# Patient Record
Sex: FEMALE | Race: WHITE | Employment: FULL TIME | URBAN - METROPOLITAN AREA
[De-identification: names, ages, dates, MRNs, and addresses within clinical notes are randomized per-mention and may not be internally consistent; named-entity substitution may affect disease eponyms.]

---

## 2023-06-13 LAB
C. TRACHOMATIS, EXTERNAL RESULT: NEGATIVE
N. GONORRHOEAE, EXTERNAL RESULT: NEGATIVE

## 2023-06-16 LAB
ABO, EXTERNAL RESULT: NORMAL
HEP B, EXTERNAL RESULT: NEGATIVE
HIV, EXTERNAL RESULT: NEGATIVE
RH FACTOR, EXTERNAL RESULT: POSITIVE
RPR, EXTERNAL RESULT: NORMAL
RUBELLA TITER, EXTERNAL RESULT: NORMAL

## 2023-12-26 LAB — GBS, EXTERNAL RESULT: POSITIVE

## 2024-01-15 ENCOUNTER — ANESTHESIA EVENT (OUTPATIENT)
Facility: HOSPITAL | Age: 29
End: 2024-01-15
Payer: COMMERCIAL

## 2024-01-15 ENCOUNTER — HOSPITAL ENCOUNTER (INPATIENT)
Facility: HOSPITAL | Age: 29
LOS: 2 days | Discharge: HOME | End: 2024-01-17
Attending: STUDENT IN AN ORGANIZED HEALTH CARE EDUCATION/TRAINING PROGRAM | Admitting: OBSTETRICS & GYNECOLOGY
Payer: COMMERCIAL

## 2024-01-15 ENCOUNTER — ANESTHESIA (OUTPATIENT)
Facility: HOSPITAL | Age: 29
End: 2024-01-15
Payer: COMMERCIAL

## 2024-01-15 PROBLEM — Z37.9 NORMAL LABOR: Status: ACTIVE | Noted: 2024-01-15

## 2024-01-15 LAB
ABO + RH BLD: NORMAL
AMNISURE, POC: NEGATIVE
BASOPHILS # BLD: 0.1 K/UL (ref 0–0.1)
BASOPHILS NFR BLD: 0 % (ref 0–1)
BLOOD GROUP ANTIBODIES SERPL: NORMAL
DIFFERENTIAL METHOD BLD: ABNORMAL
EOSINOPHIL # BLD: 0.1 K/UL (ref 0–0.4)
EOSINOPHIL NFR BLD: 1 % (ref 0–7)
ERYTHROCYTE [DISTWIDTH] IN BLOOD BY AUTOMATED COUNT: 15.4 % (ref 11.5–14.5)
HCT VFR BLD AUTO: 34 % (ref 35–47)
HGB BLD-MCNC: 10.7 G/DL (ref 11.5–16)
IMM GRANULOCYTES # BLD AUTO: 0.1 K/UL (ref 0–0.04)
IMM GRANULOCYTES NFR BLD AUTO: 1 % (ref 0–0.5)
LYMPHOCYTES # BLD: 2.7 K/UL (ref 0.8–3.5)
LYMPHOCYTES NFR BLD: 17 % (ref 12–49)
Lab: NORMAL
MCH RBC QN AUTO: 26.2 PG (ref 26–34)
MCHC RBC AUTO-ENTMCNC: 31.5 G/DL (ref 30–36.5)
MCV RBC AUTO: 83.3 FL (ref 80–99)
MONOCYTES # BLD: 1.3 K/UL (ref 0–1)
MONOCYTES NFR BLD: 8 % (ref 5–13)
NEGATIVE QC PASS/FAIL: NORMAL
NEUTS SEG # BLD: 11.4 K/UL (ref 1.8–8)
NEUTS SEG NFR BLD: 73 % (ref 32–75)
NRBC # BLD: 0 K/UL (ref 0–0.01)
NRBC BLD-RTO: 0 PER 100 WBC
PLATELET # BLD AUTO: 241 K/UL (ref 150–400)
PMV BLD AUTO: 12.5 FL (ref 8.9–12.9)
POSITIVE QC PASS/FAIL: NORMAL
RBC # BLD AUTO: 4.08 M/UL (ref 3.8–5.2)
SPECIMEN EXP DATE BLD: NORMAL
WBC # BLD AUTO: 15.6 K/UL (ref 3.6–11)

## 2024-01-15 PROCEDURE — 2500000003 HC RX 250 WO HCPCS: Performed by: ANESTHESIOLOGY

## 2024-01-15 PROCEDURE — 2580000003 HC RX 258: Performed by: STUDENT IN AN ORGANIZED HEALTH CARE EDUCATION/TRAINING PROGRAM

## 2024-01-15 PROCEDURE — 2580000003 HC RX 258: Performed by: OBSTETRICS & GYNECOLOGY

## 2024-01-15 PROCEDURE — 6360000002 HC RX W HCPCS: Performed by: STUDENT IN AN ORGANIZED HEALTH CARE EDUCATION/TRAINING PROGRAM

## 2024-01-15 PROCEDURE — 36415 COLL VENOUS BLD VENIPUNCTURE: CPT

## 2024-01-15 PROCEDURE — 2500000003 HC RX 250 WO HCPCS: Performed by: OBSTETRICS & GYNECOLOGY

## 2024-01-15 PROCEDURE — 0KQM0ZZ REPAIR PERINEUM MUSCLE, OPEN APPROACH: ICD-10-PCS | Performed by: STUDENT IN AN ORGANIZED HEALTH CARE EDUCATION/TRAINING PROGRAM

## 2024-01-15 PROCEDURE — 86901 BLOOD TYPING SEROLOGIC RH(D): CPT

## 2024-01-15 PROCEDURE — 6360000002 HC RX W HCPCS: Performed by: ANESTHESIOLOGY

## 2024-01-15 PROCEDURE — 1120000000 HC RM PRIVATE OB

## 2024-01-15 PROCEDURE — 6360000002 HC RX W HCPCS: Performed by: OBSTETRICS & GYNECOLOGY

## 2024-01-15 PROCEDURE — 7210000100 HC LABOR FEE PER 1 HR: Performed by: STUDENT IN AN ORGANIZED HEALTH CARE EDUCATION/TRAINING PROGRAM

## 2024-01-15 PROCEDURE — 3700000025 EPIDURAL BLOCK: Performed by: ANESTHESIOLOGY

## 2024-01-15 PROCEDURE — 86850 RBC ANTIBODY SCREEN: CPT

## 2024-01-15 PROCEDURE — 6370000000 HC RX 637 (ALT 250 FOR IP): Performed by: STUDENT IN AN ORGANIZED HEALTH CARE EDUCATION/TRAINING PROGRAM

## 2024-01-15 PROCEDURE — 7220000101 HC DELIVERY VAGINAL/SINGLE: Performed by: STUDENT IN AN ORGANIZED HEALTH CARE EDUCATION/TRAINING PROGRAM

## 2024-01-15 PROCEDURE — 94761 N-INVAS EAR/PLS OXIMETRY MLT: CPT

## 2024-01-15 PROCEDURE — 00HU33Z INSERTION OF INFUSION DEVICE INTO SPINAL CANAL, PERCUTANEOUS APPROACH: ICD-10-PCS | Performed by: ANESTHESIOLOGY

## 2024-01-15 PROCEDURE — 3700000156 HC EPIDURAL ANESTHESIA: Performed by: ANESTHESIOLOGY

## 2024-01-15 PROCEDURE — 86900 BLOOD TYPING SEROLOGIC ABO: CPT

## 2024-01-15 PROCEDURE — 4A1HXCZ MONITORING OF PRODUCTS OF CONCEPTION, CARDIAC RATE, EXTERNAL APPROACH: ICD-10-PCS | Performed by: STUDENT IN AN ORGANIZED HEALTH CARE EDUCATION/TRAINING PROGRAM

## 2024-01-15 PROCEDURE — 85025 COMPLETE CBC W/AUTO DIFF WBC: CPT

## 2024-01-15 PROCEDURE — 6370000000 HC RX 637 (ALT 250 FOR IP): Performed by: OBSTETRICS & GYNECOLOGY

## 2024-01-15 RX ORDER — SODIUM CHLORIDE, SODIUM LACTATE, POTASSIUM CHLORIDE, CALCIUM CHLORIDE 600; 310; 30; 20 MG/100ML; MG/100ML; MG/100ML; MG/100ML
INJECTION, SOLUTION INTRAVENOUS CONTINUOUS
Status: DISCONTINUED | OUTPATIENT
Start: 2024-01-15 | End: 2024-01-15 | Stop reason: SDUPTHER

## 2024-01-15 RX ORDER — SODIUM CHLORIDE, SODIUM LACTATE, POTASSIUM CHLORIDE, CALCIUM CHLORIDE 600; 310; 30; 20 MG/100ML; MG/100ML; MG/100ML; MG/100ML
INJECTION, SOLUTION INTRAVENOUS CONTINUOUS
Status: DISCONTINUED | OUTPATIENT
Start: 2024-01-15 | End: 2024-01-17 | Stop reason: HOSPADM

## 2024-01-15 RX ORDER — DOCUSATE SODIUM 100 MG/1
100 CAPSULE, LIQUID FILLED ORAL 2 TIMES DAILY
Status: DISCONTINUED | OUTPATIENT
Start: 2024-01-15 | End: 2024-01-17 | Stop reason: HOSPADM

## 2024-01-15 RX ORDER — FERROUS SULFATE 325(65) MG
325 TABLET ORAL EVERY OTHER DAY
COMMUNITY

## 2024-01-15 RX ORDER — BUSPIRONE HYDROCHLORIDE 5 MG/1
15 TABLET ORAL DAILY
Status: DISCONTINUED | OUTPATIENT
Start: 2024-01-15 | End: 2024-01-15

## 2024-01-15 RX ORDER — MISOPROSTOL 200 UG/1
800 TABLET ORAL PRN
Status: DISCONTINUED | OUTPATIENT
Start: 2024-01-15 | End: 2024-01-15 | Stop reason: SDUPTHER

## 2024-01-15 RX ORDER — MAGNESIUM CARB/ALUMINUM HYDROX 105-160MG
30 TABLET,CHEWABLE ORAL DAILY PRN
Status: DISCONTINUED | OUTPATIENT
Start: 2024-01-15 | End: 2024-01-17 | Stop reason: HOSPADM

## 2024-01-15 RX ORDER — CARBOPROST TROMETHAMINE 250 UG/ML
250 INJECTION, SOLUTION INTRAMUSCULAR PRN
Status: DISCONTINUED | OUTPATIENT
Start: 2024-01-15 | End: 2024-01-17 | Stop reason: HOSPADM

## 2024-01-15 RX ORDER — SODIUM CHLORIDE 9 MG/ML
25 INJECTION, SOLUTION INTRAVENOUS PRN
Status: DISCONTINUED | OUTPATIENT
Start: 2024-01-15 | End: 2024-01-15 | Stop reason: SDUPTHER

## 2024-01-15 RX ORDER — BUSPIRONE HYDROCHLORIDE 15 MG/1
15 TABLET ORAL 2 TIMES DAILY
COMMUNITY

## 2024-01-15 RX ORDER — IBUPROFEN 800 MG/1
800 TABLET ORAL EVERY 8 HOURS PRN
Status: DISCONTINUED | OUTPATIENT
Start: 2024-01-15 | End: 2024-01-15 | Stop reason: SDUPTHER

## 2024-01-15 RX ORDER — DOCUSATE SODIUM 100 MG/1
100 CAPSULE, LIQUID FILLED ORAL 2 TIMES DAILY PRN
Status: DISCONTINUED | OUTPATIENT
Start: 2024-01-15 | End: 2024-01-17 | Stop reason: HOSPADM

## 2024-01-15 RX ORDER — POLYETHYLENE GLYCOL 3350 17 G/17G
17 POWDER, FOR SOLUTION ORAL DAILY PRN
Status: DISCONTINUED | OUTPATIENT
Start: 2024-01-15 | End: 2024-01-17 | Stop reason: HOSPADM

## 2024-01-15 RX ORDER — LAMOTRIGINE 100 MG/1
100 TABLET ORAL 2 TIMES DAILY
Status: DISCONTINUED | OUTPATIENT
Start: 2024-01-15 | End: 2024-01-17 | Stop reason: HOSPADM

## 2024-01-15 RX ORDER — ONDANSETRON 2 MG/ML
4 INJECTION INTRAMUSCULAR; INTRAVENOUS EVERY 6 HOURS PRN
Status: DISCONTINUED | OUTPATIENT
Start: 2024-01-15 | End: 2024-01-15 | Stop reason: SDUPTHER

## 2024-01-15 RX ORDER — FENTANYL CITRATE 50 UG/ML
25 INJECTION, SOLUTION INTRAMUSCULAR; INTRAVENOUS
Status: DISCONTINUED | OUTPATIENT
Start: 2024-01-15 | End: 2024-01-17 | Stop reason: HOSPADM

## 2024-01-15 RX ORDER — LAMOTRIGINE 100 MG/1
200 TABLET ORAL DAILY
Status: DISCONTINUED | OUTPATIENT
Start: 2024-01-15 | End: 2024-01-15

## 2024-01-15 RX ORDER — SODIUM CHLORIDE 0.9 % (FLUSH) 0.9 %
5-40 SYRINGE (ML) INJECTION PRN
Status: DISCONTINUED | OUTPATIENT
Start: 2024-01-15 | End: 2024-01-17 | Stop reason: HOSPADM

## 2024-01-15 RX ORDER — BUPIVACAINE HYDROCHLORIDE 2.5 MG/ML
INJECTION, SOLUTION EPIDURAL; INFILTRATION; INTRACAUDAL PRN
Status: DISCONTINUED | OUTPATIENT
Start: 2024-01-15 | End: 2024-01-15 | Stop reason: SDUPTHER

## 2024-01-15 RX ORDER — TRANEXAMIC ACID 10 MG/ML
1000 INJECTION, SOLUTION INTRAVENOUS
Status: COMPLETED | OUTPATIENT
Start: 2024-01-15 | End: 2024-01-15

## 2024-01-15 RX ORDER — BUSPIRONE HYDROCHLORIDE 15 MG/1
15 TABLET ORAL 2 TIMES DAILY
Status: DISCONTINUED | OUTPATIENT
Start: 2024-01-16 | End: 2024-01-15

## 2024-01-15 RX ORDER — LANOLIN/MINERAL OIL
LOTION (ML) TOPICAL PRN
Status: DISCONTINUED | OUTPATIENT
Start: 2024-01-15 | End: 2024-01-17 | Stop reason: HOSPADM

## 2024-01-15 RX ORDER — IBUPROFEN 800 MG/1
800 TABLET ORAL EVERY 8 HOURS PRN
Status: DISCONTINUED | OUTPATIENT
Start: 2024-01-15 | End: 2024-01-17 | Stop reason: HOSPADM

## 2024-01-15 RX ORDER — MISOPROSTOL 200 UG/1
200 TABLET ORAL PRN
Status: DISCONTINUED | OUTPATIENT
Start: 2024-01-15 | End: 2024-01-17 | Stop reason: HOSPADM

## 2024-01-15 RX ORDER — LIDOCAINE HYDROCHLORIDE AND EPINEPHRINE 15; 5 MG/ML; UG/ML
INJECTION, SOLUTION EPIDURAL PRN
Status: DISCONTINUED | OUTPATIENT
Start: 2024-01-15 | End: 2024-01-15 | Stop reason: SDUPTHER

## 2024-01-15 RX ORDER — FAMOTIDINE 20 MG/1
20 TABLET, FILM COATED ORAL 2 TIMES DAILY PRN
Status: DISCONTINUED | OUTPATIENT
Start: 2024-01-15 | End: 2024-01-17 | Stop reason: HOSPADM

## 2024-01-15 RX ORDER — SIMETHICONE 80 MG
80 TABLET,CHEWABLE ORAL EVERY 6 HOURS PRN
Status: DISCONTINUED | OUTPATIENT
Start: 2024-01-15 | End: 2024-01-17 | Stop reason: HOSPADM

## 2024-01-15 RX ORDER — FENTANYL 0.2 MG/100ML-BUPIV 0.125%-NACL 0.9% EPIDURAL INJ 2/0.125 MCG/ML-%
12 SOLUTION INJECTION CONTINUOUS
Status: DISCONTINUED | OUTPATIENT
Start: 2024-01-15 | End: 2024-01-17 | Stop reason: HOSPADM

## 2024-01-15 RX ORDER — SODIUM CHLORIDE 0.9 % (FLUSH) 0.9 %
5-40 SYRINGE (ML) INJECTION EVERY 12 HOURS SCHEDULED
Status: DISCONTINUED | OUTPATIENT
Start: 2024-01-15 | End: 2024-01-17 | Stop reason: HOSPADM

## 2024-01-15 RX ORDER — ACETAMINOPHEN 325 MG/1
650 TABLET ORAL EVERY 4 HOURS PRN
Status: DISCONTINUED | OUTPATIENT
Start: 2024-01-15 | End: 2024-01-15 | Stop reason: SDUPTHER

## 2024-01-15 RX ORDER — ONDANSETRON 2 MG/ML
4 INJECTION INTRAMUSCULAR; INTRAVENOUS EVERY 6 HOURS PRN
Status: DISCONTINUED | OUTPATIENT
Start: 2024-01-15 | End: 2024-01-17 | Stop reason: HOSPADM

## 2024-01-15 RX ORDER — MISOPROSTOL 200 UG/1
800 TABLET ORAL PRN
Status: DISCONTINUED | OUTPATIENT
Start: 2024-01-15 | End: 2024-01-17 | Stop reason: HOSPADM

## 2024-01-15 RX ORDER — SODIUM CHLORIDE, SODIUM LACTATE, POTASSIUM CHLORIDE, AND CALCIUM CHLORIDE .6; .31; .03; .02 G/100ML; G/100ML; G/100ML; G/100ML
1000 INJECTION, SOLUTION INTRAVENOUS ONCE
Status: COMPLETED | OUTPATIENT
Start: 2024-01-15 | End: 2024-01-15

## 2024-01-15 RX ORDER — ONDANSETRON 4 MG/1
4 TABLET, FILM COATED ORAL EVERY 8 HOURS PRN
COMMUNITY

## 2024-01-15 RX ORDER — SODIUM CHLORIDE, SODIUM LACTATE, POTASSIUM CHLORIDE, AND CALCIUM CHLORIDE .6; .31; .03; .02 G/100ML; G/100ML; G/100ML; G/100ML
500 INJECTION, SOLUTION INTRAVENOUS PRN
Status: DISCONTINUED | OUTPATIENT
Start: 2024-01-15 | End: 2024-01-17 | Stop reason: HOSPADM

## 2024-01-15 RX ORDER — BUSPIRONE HYDROCHLORIDE 15 MG/1
15 TABLET ORAL 2 TIMES DAILY
Status: DISCONTINUED | OUTPATIENT
Start: 2024-01-15 | End: 2024-01-17 | Stop reason: HOSPADM

## 2024-01-15 RX ORDER — ACETAMINOPHEN 500 MG
1000 TABLET ORAL EVERY 8 HOURS PRN
Status: DISCONTINUED | OUTPATIENT
Start: 2024-01-15 | End: 2024-01-17 | Stop reason: HOSPADM

## 2024-01-15 RX ORDER — NALOXONE HYDROCHLORIDE 0.4 MG/ML
INJECTION, SOLUTION INTRAMUSCULAR; INTRAVENOUS; SUBCUTANEOUS PRN
Status: DISCONTINUED | OUTPATIENT
Start: 2024-01-15 | End: 2024-01-17 | Stop reason: HOSPADM

## 2024-01-15 RX ORDER — TRANEXAMIC ACID 10 MG/ML
1000 INJECTION, SOLUTION INTRAVENOUS
Status: ACTIVE | OUTPATIENT
Start: 2024-01-15 | End: 2024-01-16

## 2024-01-15 RX ORDER — SODIUM CHLORIDE 0.9 % (FLUSH) 0.9 %
5-40 SYRINGE (ML) INJECTION EVERY 12 HOURS SCHEDULED
Status: DISCONTINUED | OUTPATIENT
Start: 2024-01-15 | End: 2024-01-15 | Stop reason: SDUPTHER

## 2024-01-15 RX ORDER — LAMOTRIGINE 100 MG/1
100 TABLET ORAL 2 TIMES DAILY
COMMUNITY

## 2024-01-15 RX ORDER — METHYLERGONOVINE MALEATE 0.2 MG/ML
200 INJECTION INTRAVENOUS PRN
Status: DISCONTINUED | OUTPATIENT
Start: 2024-01-15 | End: 2024-01-17 | Stop reason: HOSPADM

## 2024-01-15 RX ORDER — SODIUM CHLORIDE 0.9 % (FLUSH) 0.9 %
5-40 SYRINGE (ML) INJECTION PRN
Status: DISCONTINUED | OUTPATIENT
Start: 2024-01-15 | End: 2024-01-15 | Stop reason: SDUPTHER

## 2024-01-15 RX ORDER — FERROUS SULFATE 325(65) MG
325 TABLET ORAL 2 TIMES DAILY WITH MEALS
Status: DISCONTINUED | OUTPATIENT
Start: 2024-01-15 | End: 2024-01-17 | Stop reason: HOSPADM

## 2024-01-15 RX ORDER — SODIUM CHLORIDE 9 MG/ML
INJECTION, SOLUTION INTRAVENOUS PRN
Status: DISCONTINUED | OUTPATIENT
Start: 2024-01-15 | End: 2024-01-17 | Stop reason: HOSPADM

## 2024-01-15 RX ORDER — SODIUM CHLORIDE, SODIUM LACTATE, POTASSIUM CHLORIDE, AND CALCIUM CHLORIDE .6; .31; .03; .02 G/100ML; G/100ML; G/100ML; G/100ML
1000 INJECTION, SOLUTION INTRAVENOUS PRN
Status: DISCONTINUED | OUTPATIENT
Start: 2024-01-15 | End: 2024-01-17 | Stop reason: HOSPADM

## 2024-01-15 RX ORDER — METHYLERGONOVINE MALEATE 0.2 MG/ML
200 INJECTION INTRAVENOUS PRN
Status: DISCONTINUED | OUTPATIENT
Start: 2024-01-15 | End: 2024-01-15 | Stop reason: SDUPTHER

## 2024-01-15 RX ADMIN — ONDANSETRON 4 MG: 2 INJECTION INTRAMUSCULAR; INTRAVENOUS at 15:11

## 2024-01-15 RX ADMIN — DOCUSATE SODIUM 100 MG: 100 CAPSULE, LIQUID FILLED ORAL at 16:31

## 2024-01-15 RX ADMIN — HYDROMORPHONE HYDROCHLORIDE 0.5 MG: 1 INJECTION, SOLUTION INTRAMUSCULAR; INTRAVENOUS; SUBCUTANEOUS at 02:50

## 2024-01-15 RX ADMIN — SODIUM CHLORIDE 2.5 MILLION UNITS: 9 INJECTION, SOLUTION INTRAVENOUS at 06:39

## 2024-01-15 RX ADMIN — METHYLERGONOVINE MALEATE 200 MCG: 0.2 INJECTION, SOLUTION INTRAMUSCULAR; INTRAVENOUS at 14:53

## 2024-01-15 RX ADMIN — SODIUM CHLORIDE 25 ML: 9 INJECTION, SOLUTION INTRAVENOUS at 10:56

## 2024-01-15 RX ADMIN — TRANEXAMIC ACID 1000 MG: 10 INJECTION, SOLUTION INTRAVENOUS at 14:54

## 2024-01-15 RX ADMIN — SODIUM CHLORIDE 5 MILLION UNITS: 900 INJECTION INTRAVENOUS at 02:53

## 2024-01-15 RX ADMIN — LIDOCAINE HYDROCHLORIDE AND EPINEPHRINE 4.5 ML: 15; 5 INJECTION, SOLUTION EPIDURAL at 03:47

## 2024-01-15 RX ADMIN — IBUPROFEN 800 MG: 800 TABLET, FILM COATED ORAL at 16:48

## 2024-01-15 RX ADMIN — DOCUSATE SODIUM 100 MG: 100 CAPSULE, LIQUID FILLED ORAL at 21:06

## 2024-01-15 RX ADMIN — SODIUM CHLORIDE, POTASSIUM CHLORIDE, SODIUM LACTATE AND CALCIUM CHLORIDE 1000 ML: 600; 310; 30; 20 INJECTION, SOLUTION INTRAVENOUS at 02:52

## 2024-01-15 RX ADMIN — LAMOTRIGINE 100 MG: 100 TABLET ORAL at 21:06

## 2024-01-15 RX ADMIN — OXYTOCIN 2 MILLI-UNITS/MIN: 10 INJECTION, SOLUTION INTRAMUSCULAR; INTRAVENOUS at 08:01

## 2024-01-15 RX ADMIN — Medication 12 ML/HR: at 03:58

## 2024-01-15 RX ADMIN — LIDOCAINE HYDROCHLORIDE AND EPINEPHRINE 3 ML: 15; 5 INJECTION, SOLUTION EPIDURAL at 03:43

## 2024-01-15 RX ADMIN — Medication 166.7 ML: at 14:45

## 2024-01-15 RX ADMIN — BUSPIRONE HYDROCHLORIDE 15 MG: 15 TABLET ORAL at 21:06

## 2024-01-15 RX ADMIN — BUPIVACAINE HYDROCHLORIDE 5 ML: 2.5 INJECTION, SOLUTION EPIDURAL; INFILTRATION; INTRACAUDAL; PERINEURAL at 10:42

## 2024-01-15 RX ADMIN — LAMOTRIGINE 200 MG: 100 TABLET ORAL at 12:03

## 2024-01-15 RX ADMIN — SODIUM CHLORIDE, POTASSIUM CHLORIDE, SODIUM LACTATE AND CALCIUM CHLORIDE: 600; 310; 30; 20 INJECTION, SOLUTION INTRAVENOUS at 03:49

## 2024-01-15 RX ADMIN — MINERAL OIL 30 ML: 1000 SOLUTION ORAL at 13:39

## 2024-01-15 RX ADMIN — SODIUM CHLORIDE, POTASSIUM CHLORIDE, SODIUM LACTATE AND CALCIUM CHLORIDE: 600; 310; 30; 20 INJECTION, SOLUTION INTRAVENOUS at 10:19

## 2024-01-15 RX ADMIN — Medication 12 ML/HR: at 10:02

## 2024-01-15 ASSESSMENT — PAIN DESCRIPTION - LOCATION: LOCATION: ABDOMEN

## 2024-01-15 ASSESSMENT — PAIN SCALES - GENERAL
PAINLEVEL_OUTOF10: 10
PAINLEVEL_OUTOF10: 0

## 2024-01-15 NOTE — L&D DELIVERY NOTE
Davon Norman [[id number]]      Labor Events     Labor: No   Steroids: None  Cervical Ripening Date/Time:      Antibiotics Received during Labor: Yes  Rupture Identifier: Sac 1  Rupture Date/Time:  1/15/24 00:30:00   Rupture Type: SROM  Fluid Color: Clear  Meconium Consistency: Thin  Fluid Odor: None  Fluid Volume: Moderate  Induction: None  Augmentation: Oxytocin  Labor Complications: None              Anesthesia    Method: Epidural       Labor Event Times      Labor onset date/time:  1/15/24 12:30:00 EST     Dilation complete date/time:  1/15/24 11:44:00     Start pushing date/time:  1/15/2024 12:08:00   Decision date/time (emergent ):            Delivery Details      Delivery Date: 1/15/24 Delivery Time: 14:42:00   Delivery Type: Vaginal, Spontaneous               Presentation    Presentation: Vertex       Shoulder Dystocia    Shoulder Dystocia Present?: No       Assisted Delivery Details    Forceps Attempted?: No  Vacuum Extractor Attempted?: No                           Cord    Vessels: 3 Vessels  Complications: None  Delayed Cord Clamping?: Yes  Cord Clamped Date/Time: 1/15/2024 14:43:00  Cord Blood Disposition: Lab  Gases Sent?: No              Placenta    Date/Time: 1/15/2024 14:46:00  Removal: Expressed  Appearance: Intact  Disposition: Discarded       Lacerations           Vaginal Counts    Initial Count Personnel: NICOLAS MONROY  Initial Count Verified By: FLAVIA SALINAS  Intial Sponge Count: Correct Intial Needles Count: Correct Intial Instruments Count: Correct   Final Sponges Count: Correct Final Needles  Count: Correct Final Instruments Count: Correct   Final Count Personnel: CONCHITA  Final Count Verified By: BRAD  Accurate Final Count?: Yes       Blood Loss  Mother: Mikayla Norman #[id number]     Start of Mother's Information      Delivery Blood Loss  01/15/24 1230 - 01/15/24 1506      Quantitative Blood Loss (mL) Hospital Encounter 200 grams    Total  200 mL

## 2024-01-15 NOTE — PROGRESS NOTES
Labor Note    Mikayla Norman  [id number]  1995   39w5d      S:  Feeling comfortable with epidural    O:    /74   Pulse 95   Temp 98.4 °F (36.9 °C) (Oral)   Resp 18   SpO2 98%        SVE: Deferred, checked at 6:30, /0    A/P:  28 y.o.  @ 39w5d - labor   - GBS pos / Rhpos  - FWB:  [facility]. Cat 1  - Labor course:  SROM at 00:30, expectantly managed. Currently acontractile on toco, will start pitocin  - Pain control - PCEA in place, functioning well  - GBS prophylaxis: s/p PCN x2 doses    Luanne Sears MD  [facility]

## 2024-01-15 NOTE — ANESTHESIA POSTPROCEDURE EVALUATION
Department of Anesthesiology  Postprocedure Note    Patient: Mikayla Norman  MRN: [id number]  YOB: 1995  Date of evaluation: 1/15/2024    Procedure Summary       Date: 01/15/24 Room / Location:     Anesthesia Start: 0334 Anesthesia Stop: 1442    Procedure: Labor Analgesia Diagnosis:     Scheduled Providers:  Responsible Provider: Maged Coronel MD    Anesthesia Type: epidural ASA Status: 2 - Emergent            Anesthesia Type: No value filed.    Tamar Phase I:      Tamar Phase II:      Anesthesia Post Evaluation    Comments: No known complications     No notable events documented.

## 2024-01-15 NOTE — PROGRESS NOTES
0240-Dr. Puckett to bedside for patient evaluation, bedside ultrasound performed, vertex confirmed.    0328-Dr. Coronel at bedside for epidural placement    0334-Epidural Time out performed

## 2024-01-15 NOTE — PROGRESS NOTES
Second Stage Assessment    Complete at 11:45  Started pushing at 12:07  0 to +1 station, some caput present. Pushing well with good maternal effort    Luanne Sears MD

## 2024-01-15 NOTE — ANESTHESIA PRE PROCEDURE
Substance Use Topics   • Alcohol use: Not Currently                                Counseling given: Not Answered      Vital Signs (Current):   Vitals:    01/15/24 0146 01/15/24 0149   BP: 133/75    Pulse: (!) 110    Resp: 18    Temp: 97.7 °F (36.5 °C)    TempSrc: Oral    SpO2: 99% 99%                                              BP Readings from Last 3 Encounters:   01/15/24 133/75       NPO Status:                                                                                 BMI:   Wt Readings from Last 3 Encounters:   No data found for Wt     There is no height or weight on file to calculate BMI.    CBC:   Lab Results   Component Value Date/Time    WBC 15.6 01/15/2024 02:45 AM    RBC 4.08 01/15/2024 02:45 AM    HGB 10.7 01/15/2024 02:45 AM    HCT 34.0 01/15/2024 02:45 AM    MCV 83.3 01/15/2024 02:45 AM    RDW 15.4 01/15/2024 02:45 AM     01/15/2024 02:45 AM       CMP: No results found for: \"NA\", \"K\", \"CL\", \"CO2\", \"BUN\", \"CREATININE\", \"GFRAA\", \"AGRATIO\", \"LABGLOM\", \"GLUCOSE\", \"GLU\", \"PROT\", \"CALCIUM\", \"BILITOT\", \"ALKPHOS\", \"AST\", \"ALT\"    POC Tests: No results for input(s): \"POCGLU\", \"POCNA\", \"POCK\", \"POCCL\", \"POCBUN\", \"POCHEMO\", \"POCHCT\" in the last 72 hours.    Coags: No results found for: \"PROTIME\", \"INR\", \"APTT\"    HCG (If Applicable): No results found for: \"PREGTESTUR\", \"PREGSERUM\", \"HCG\", \"HCGQUANT\"     ABGs: No results found for: \"PHART\", \"PO2ART\", \"MXF7VGL\", \"JQZ5DUR\", \"BEART\", \"G9HALAIO\"     Type & Screen (If Applicable):  No results found for: \"LABABO\", \"LABRH\"    Drug/Infectious Status (If Applicable):  No results found for: \"HIV\", \"HEPCAB\"    COVID-19 Screening (If Applicable): No results found for: \"COVID19\"        Anesthesia Evaluation  Patient summary reviewed and Nursing notes reviewed  Airway: Mallampati: I  TM distance: >3 FB   Neck ROM: full  Mouth opening: > = 3 FB   Dental: normal exam         Pulmonary:Negative Pulmonary ROS and normal exam

## 2024-01-15 NOTE — H&P
History & Physical    Name: Mikayla Norman MRN: [id number]  SSN: [id number]    YOB: 1995  Age: 28 y.o.  Sex: female        Subjective:     Estimated Date of Delivery: 24  OB History          1    Para        Term                AB        Living             SAB        IAB        Ectopic        Molar        Multiple        Live Births                    Ms. Norman is a 27 y/o F  at 39 weeks 5 days admitted with pregnancy for active labor. Prenatal course was normal. Please see prenatal records for details.    Past Medical History:   Diagnosis Date    Anxiety     Mental disorder      Past Surgical History:   Procedure Laterality Date    TONSILLECTOMY AND ADENOIDECTOMY       Social History     Occupational History    Not on file   Tobacco Use    Smoking status: Never    Smokeless tobacco: Not on file   Substance and Sexual Activity    Alcohol use: Not Currently    Drug use: Never    Sexual activity: Yes     No family history on file.    No Known Allergies  Prior to Admission medications    Medication Sig Start Date End Date Taking? Authorizing Provider   ondansetron (ZOFRAN) 4 MG tablet Take 1 tablet by mouth every 8 hours as needed for Nausea or Vomiting   Yes Eleni Wayne MD   busPIRone (BUSPAR) 15 MG tablet Take 15 mg by mouth in the morning and at bedtime   Yes Eleni Wayne MD   lamoTRIgine (LAMICTAL) 100 MG tablet Take 1 tablet by mouth 2 times daily   Yes Eleni Wayne MD   ferrous sulfate (IRON 325) 325 (65 Fe) MG tablet Take 1 tablet by mouth every other day   Yes ProviderEleni MD        Review of Systems: Pertinent items are noted in HPI.    Objective:     Vitals:  Vitals:    01/15/24 0146 01/15/24 0149   BP: 133/75    Pulse: (!) 110    Resp: 18    Temp: 97.7 °F (36.5 °C)    TempSrc: Oral    SpO2: 99% 99%        Physical Exam:  Patient with distress, Abdomen Soft, tender, Fundus firm and moderately tender, Right upper quadrant

## 2024-01-15 NOTE — PROGRESS NOTES
Bedside report received from Preeti So RN    0734 MD Renu at bedside. VORB start pitocin.     [id number] Report given to Sheryl Mosley. Care relinquished at this time.    0953 This RN performed SVE 9cm due to variables and pt pain    0955 This RN notified MD Renu of recurrent variables and small amt of bleeding. Provider requested at bedside for IUPC placement.    1018 MD Jessica called to bedside for evaluation of pt. SVE lip/+1.     0959 EMANUEL Sal requested at beside to evaluate epidural as it continues to say \"occulusion\" despite a new line and bag being changed.    1033 MD Yoon requested at bedside to evaluate epidural.    1035 MD Yoon at bedside evaluating epidural    1040 Pt states she feels like she needs to push. This RN performed SVE lip/+1. MD Renu notified    [id number] Report given to Kamille Rodriguez RN. Care relinquished at this time.    1144 This RN performed SVE per MD request. C/C/+2.  Pt assisted to throne.    1206 Thin mec noted, Charge Nurse and MD Renu made aware    1208 Patient actively pushing.  RN remains in continuous attendance at the bedside.  Assessment & evaluation of fetal heart rate ongoing via continuous EFM.     1332 MD Renu at bedside evaluating pushing    1410 MD Renu performed straight cath    1442 RN remained at bedside throughout pushing.  EFM continuously assessed.  Vaginal delivery of viable infant.     1655 This RN assisted pt up to void. Pt voided 600cc clear yellow urine. Pt tolerated well. Pericare provided. Ice pack applied to perineum. Pt assisted back to bed.    1805 This RN assisted pt up to void. Pt voided 400cc clear yellow urine. Pericare provided. Ice pack applied to perineum.     1810 Bedside report given to Amy Goode RN

## 2024-01-15 NOTE — ANESTHESIA PROCEDURE NOTES
Epidural Block    Patient location during procedure: OB  Start time: 1/15/2024 3:34 AM  End time: 1/15/2024 3:43 AM  Reason for block: labor epidural  Staffing  Anesthesiologist: Maged Coronel MD  Performed by: Maged Coronel MD  Authorized by: Maged Coronel MD    Epidural  Patient position: sitting  Prep: Betadine  Patient monitoring: frequent blood pressure checks and continuous pulse ox  Approach: midline  Location: L3-4  Injection technique: ENA air  Provider prep: mask and sterile gloves  Needle  Needle type: Tuohy   Needle gauge: 17 G  Needle length: 3.5 in  Needle insertion depth: 5 cm  Catheter type: multi-orifice  Catheter size: 20 G  Catheter at skin depth: 10 cm  Test dose: negativeCatheter Secured: tegaderm and tape  Assessment  Hemodynamics: stable  Attempts: 1  Outcomes: patient tolerated procedure well  Additional Notes  Pt voiced immediate relief   Tolerated well  PCEA explained AVU   See TD for BP/HR/Sats/FHT all ok   Preanesthetic Checklist  Completed: patient identified, IV checked, site marked, risks and benefits discussed, surgical/procedural consents, equipment checked, pre-op evaluation, timeout performed, anesthesia consent given, oxygen available, monitors applied/VS acknowledged, fire risk safety assessment completed and verbalized and blood product R/B/A discussed and consented

## 2024-01-15 NOTE — PROGRESS NOTES
LABOR CHECK      Name: Mikayla Norman MRN: [id number]  SSN: [id number]    YOB: 1995  Age: 28 y.o.  Sex: female      Mikayla Norman 28 y.o.  at 39w5d  admitted for Spontaneous labor    Current Facility-Administered Medications   Medication Dose Route Frequency    lactated ringers IV soln infusion   IntraVENous Continuous    lactated ringers bolus bolus 500 mL  500 mL IntraVENous PRN    Or    lactated ringers bolus bolus 1,000 mL  1,000 mL IntraVENous PRN    sodium chloride flush 0.9 % injection 5-40 mL  5-40 mL IntraVENous 2 times per day    sodium chloride flush 0.9 % injection 5-40 mL  5-40 mL IntraVENous PRN    0.9 % sodium chloride infusion  25 mL IntraVENous PRN    ondansetron (ZOFRAN) injection 4 mg  4 mg IntraVENous Q6H PRN    oxytocin (PITOCIN) 30 units in 500 mL infusion  87.3 amanda-units/min IntraVENous Continuous PRN    And    oxytocin (PITOCIN) 10 unit bolus from the bag  10 Units IntraVENous PRN    methylergonovine (METHERGINE) injection 200 mcg  200 mcg IntraMUSCular PRN    carboprost (HEMABATE) injection 250 mcg  250 mcg IntraMUSCular PRN    miSOPROStol (CYTOTEC) tablet 800 mcg  800 mcg Rectal PRN    tranexamic acid-NaCl IVPB premix 1,000 mg  1,000 mg IntraVENous Once PRN    acetaminophen (TYLENOL) tablet 650 mg  650 mg Oral Q4H PRN    benzocaine-menthol (DERMOPLAST) 20-0.5 % spray   Topical PRN    docusate sodium (COLACE) capsule 100 mg  100 mg Oral BID    fentaNYL (SUBLIMAZE) injection 25 mcg  25 mcg IntraVENous Q1H PRN    HYDROmorphone (DILAUDID) injection 0.25 mg  0.25 mg IntraVENous Q3H PRN    Or    HYDROmorphone (DILAUDID) injection 0.5 mg  0.5 mg IntraVENous Q3H PRN    penicillin G potassium 2.5 million units in 0.9% sodium chloride 100 mL IVPB  2.5 Million Units IntraVENous Q4H    fentaNYL 2 mcg/mL BUPivacaine 0.125% in sodium chloride 0.9% 100 mL epidural infusion  12 mL/hr Epidural Continuous    naloxone 0.4 mg

## 2024-01-15 NOTE — DISCHARGE INSTRUCTIONS
of magnesia or rectal remedies such as Dulcolax or Fleet’s enema.      Recovery: What to Expect at Home  Fatigue is expected.  Try to rest when you can and don't worry about doing housework or other tasks which can wait.  The soreness along your bottom will improve significantly over the first 2 weeks, but it may take 6 weeks before you are completely recovered.  Back pain or general body aches or muscle soreness are expected and should improve with acetominophen or ibuprofen.  Leg swelling due to pregnancy and/or IV fluids given in the hospital will take about two weeks to resolve.  Most women experience some form of the \"Baby Blues\" after having a baby.  Feeling emotional, tearful, frustrated, anxious, sad, and irritable some of the time is normal and go away after about 2 weeks.  Adequate rest and help from your family will help.  Take breaks from caring for the baby.  Call your doctor if your symptoms seem severe, last more than 2 weeks, or seem to be getting worse instead of better.  Get help immediately if you have thoughts of wanting to hurt yourself or others!      Call your doctor or seek immediate medical care if you have:  Heavy vaginal bleeding, soaking through one or more pads an hour for several hours.   Foul-smelling discharge from your vagina or incision.  Consistent nausea and vomiting and cannot keep fluids down.  Consistent pain that does not get better after you take pain medicine.  Sudden chest pain and shortness of breath  Signs of a blood clot: pain/ swelling/ increasing redness in your lower extremeties  Signs of infection: increased pain in your abdomen or vaginal area; red streaks, warmth, or tenderness of your breasts; fever of 100.5 F or greater

## 2024-01-15 NOTE — DISCHARGE SUMMARY
Obstetrical Discharge Summary     Name: Mikayla Norman MRN: [id number]  SSN: [id number]    YOB: 1995  Age: 28 y.o.  Sex: female      Admit Date: 1/15/2024    Discharge Date: 24     Attending Physician:  Luanne Sears MD     Delivering Physician:  Luanne Sears MD     * Admission Diagnoses:   IUP @ 39w5d  Spontaneous rupture of membranes      * Discharge Diagnoses:   Delivery of a viable infant via  by Luanne Sears MD on 1/15/2024.  Apgars were 8 and 9.    Same as above     Additional Diagnoses:      No results found for: \"RUBELLAEXT\", \"GRBSEXT\"     There is no immunization history on file for this patient.    * Procedures:          * Discharge Condition: good    * Hospital Course: Normal hospital course following the delivery.    * Disposition: Home    Discharge Medications:      Medication List        ASK your doctor about these medications      busPIRone 15 MG tablet  Commonly known as: BUSPAR     ferrous sulfate 325 (65 Fe) MG tablet  Commonly known as: IRON 325     lamoTRIgine 100 MG tablet  Commonly known as: LAMICTAL     ondansetron 4 MG tablet  Commonly known as: ZOFRAN              * Follow-up Care/Patient Instructions:  Activity: Activity as tolerated  Diet: Regular Diet  Wound Care: As directed  Followup 1 week for mood check        Signed By:  Luanne Sears MD     January 15, 2024

## 2024-01-16 LAB
ERYTHROCYTE [DISTWIDTH] IN BLOOD BY AUTOMATED COUNT: 15.2 % (ref 11.5–14.5)
HCT VFR BLD AUTO: 29 % (ref 35–47)
HGB BLD-MCNC: 9.2 G/DL (ref 11.5–16)
MCH RBC QN AUTO: 25.6 PG (ref 26–34)
MCHC RBC AUTO-ENTMCNC: 31.7 G/DL (ref 30–36.5)
MCV RBC AUTO: 80.8 FL (ref 80–99)
NRBC # BLD: 0 K/UL (ref 0–0.01)
NRBC BLD-RTO: 0 PER 100 WBC
PLATELET # BLD AUTO: 245 K/UL (ref 150–400)
PMV BLD AUTO: 12.1 FL (ref 8.9–12.9)
RBC # BLD AUTO: 3.59 M/UL (ref 3.8–5.2)
WBC # BLD AUTO: 19.3 K/UL (ref 3.6–11)

## 2024-01-16 PROCEDURE — 6370000000 HC RX 637 (ALT 250 FOR IP): Performed by: OBSTETRICS & GYNECOLOGY

## 2024-01-16 PROCEDURE — 36415 COLL VENOUS BLD VENIPUNCTURE: CPT

## 2024-01-16 PROCEDURE — 1120000000 HC RM PRIVATE OB

## 2024-01-16 PROCEDURE — 94761 N-INVAS EAR/PLS OXIMETRY MLT: CPT

## 2024-01-16 PROCEDURE — 6370000000 HC RX 637 (ALT 250 FOR IP): Performed by: STUDENT IN AN ORGANIZED HEALTH CARE EDUCATION/TRAINING PROGRAM

## 2024-01-16 PROCEDURE — 85027 COMPLETE CBC AUTOMATED: CPT

## 2024-01-16 RX ADMIN — FERROUS SULFATE TAB 325 MG (65 MG ELEMENTAL FE) 325 MG: 325 (65 FE) TAB at 11:51

## 2024-01-16 RX ADMIN — IBUPROFEN 800 MG: 800 TABLET, FILM COATED ORAL at 01:20

## 2024-01-16 RX ADMIN — BUSPIRONE HYDROCHLORIDE 15 MG: 15 TABLET ORAL at 11:51

## 2024-01-16 RX ADMIN — DOCUSATE SODIUM 100 MG: 100 CAPSULE, LIQUID FILLED ORAL at 20:10

## 2024-01-16 RX ADMIN — FERROUS SULFATE TAB 325 MG (65 MG ELEMENTAL FE) 325 MG: 325 (65 FE) TAB at 20:10

## 2024-01-16 RX ADMIN — BUSPIRONE HYDROCHLORIDE 15 MG: 15 TABLET ORAL at 20:10

## 2024-01-16 RX ADMIN — LAMOTRIGINE 100 MG: 100 TABLET ORAL at 20:11

## 2024-01-16 RX ADMIN — IBUPROFEN 800 MG: 800 TABLET, FILM COATED ORAL at 20:10

## 2024-01-16 RX ADMIN — DOCUSATE SODIUM 100 MG: 100 CAPSULE, LIQUID FILLED ORAL at 11:51

## 2024-01-16 RX ADMIN — LAMOTRIGINE 100 MG: 100 TABLET ORAL at 11:52

## 2024-01-16 RX ADMIN — IBUPROFEN 800 MG: 800 TABLET, FILM COATED ORAL at 11:51

## 2024-01-16 ASSESSMENT — PAIN DESCRIPTION - LOCATION
LOCATION: PERINEUM
LOCATION: ABDOMEN;PERINEUM
LOCATION: ABDOMEN

## 2024-01-16 ASSESSMENT — PAIN DESCRIPTION - DESCRIPTORS
DESCRIPTORS: CRAMPING
DESCRIPTORS: SORE
DESCRIPTORS: SORE

## 2024-01-16 ASSESSMENT — PAIN DESCRIPTION - ORIENTATION
ORIENTATION: LOWER
ORIENTATION: MID

## 2024-01-16 ASSESSMENT — PAIN - FUNCTIONAL ASSESSMENT: PAIN_FUNCTIONAL_ASSESSMENT: ACTIVITIES ARE NOT PREVENTED

## 2024-01-16 ASSESSMENT — PAIN SCALES - GENERAL
PAINLEVEL_OUTOF10: 1
PAINLEVEL_OUTOF10: 4
PAINLEVEL_OUTOF10: 1

## 2024-01-16 ASSESSMENT — PAIN DESCRIPTION - FREQUENCY: FREQUENCY: INTERMITTENT

## 2024-01-16 NOTE — PROCEDURES
The clinician documented this note on the incorrect encounter. This information has been reviewed and the note has been moved to the correct visit. Please contact local medical records if there are any questions.

## 2024-01-16 NOTE — PROGRESS NOTES
Post-Partum Day Number 1 Progress Note    Patient doing well post-partum without significant complaint.      Vitals:  Patient Vitals for the past 8 hrs:   BP Temp Temp src Pulse Resp SpO2   24 0740 109/80 98.2 °F (36.8 °C) Oral 89 16 98 %   24 0300 105/73 97.5 °F (36.4 °C) Oral 98 20 97 %     Temp (24hrs), Av.2 °F (36.8 °C), Min:97.5 °F (36.4 °C), Max:98.9 °F (37.2 °C)      Vital signs stable, afebrile.    Exam:  Patient without distress.               Abdomen soft, fundus firm at level of umbilicus, nontender                              Lower extremities are negative for swelling, cords or tenderness.    Lab/Data Review:  All lab results for the last 24 hours reviewed.    Assessment and Plan:  Patient appears to be having uncomplicated post-partum course.  Continue routine perineal care and maternal education.  Plan discharge tomorrow if no problems occur.  2. Hx of anxiety                  Chuy Colby MD  2024

## 2024-01-16 NOTE — LACTATION NOTE
This note was copied from a baby's chart.  BF basics reviewed.  Mother taking Buspar 15 mg L3 and Lamictal 100mg L2 both bid.  Printed info given to parents on both meds in regards to breastfeeding.  Lamictal has a recommendation form [employer] for baby to have blood levels checked for medication transfer dose due to possible infant relative dose of 15-30%.  Parents aware of this recommendation and the importance of speaking with the babies outpatient pediatrician on this matter.  Also encouraged mother to speak to her psychiatrist to see if another med may be more appropriate.  Assisted parents with feeding baby at breast.  Baby latched and sucked a few times. Overall baby has been feeding well.    Discussed with mother her plan for feeding.  Reviewed the benefits of exclusive breast milk feeding during the hospital stay.    She acknowledges understanding of information reviewed and states that it is her plan to breastfeed her infant.  Will support her choice and offer additional information as needed.     Reviewed breastfeeding basics:  How milk is made and normal  breastfeeding behaviors discussed.  Supply and demand,  stomach size, early feeding cues, skin to skin bonding with comfortable positioning and baby led latch-on reviewed.  How to identify signs of successful breastfeeding sessions reviewed; education on asymetrical latch, signs of effective latching vs shallow, in-effective latching, normal  feeding frequency and duration and expected infant output discussed.  Normal course of breastfeeding discussed including the AAP's recommendation that children receive exclusive breast milk feedings for the first six months of life with breast milk feedings to continue through the first year of life and/or beyond as complimentary table foods are added.  Breastfeeding Booklet and Warm line information provided with discussion.  Discussed typical  weight loss and the importance of

## 2024-01-17 VITALS
OXYGEN SATURATION: 99 % | SYSTOLIC BLOOD PRESSURE: 117 MMHG | TEMPERATURE: 98.4 F | RESPIRATION RATE: 16 BRPM | DIASTOLIC BLOOD PRESSURE: 76 MMHG | HEART RATE: 83 BPM

## 2024-01-17 PROCEDURE — 6370000000 HC RX 637 (ALT 250 FOR IP): Performed by: STUDENT IN AN ORGANIZED HEALTH CARE EDUCATION/TRAINING PROGRAM

## 2024-01-17 RX ADMIN — IBUPROFEN 800 MG: 800 TABLET, FILM COATED ORAL at 08:28

## 2024-01-17 RX ADMIN — FERROUS SULFATE TAB 325 MG (65 MG ELEMENTAL FE) 325 MG: 325 (65 FE) TAB at 08:28

## 2024-01-17 RX ADMIN — LAMOTRIGINE 100 MG: 100 TABLET ORAL at 08:28

## 2024-01-17 RX ADMIN — BUSPIRONE HYDROCHLORIDE 15 MG: 15 TABLET ORAL at 08:29

## 2024-01-17 RX ADMIN — DOCUSATE SODIUM 100 MG: 100 CAPSULE, LIQUID FILLED ORAL at 08:28

## 2024-01-17 ASSESSMENT — PAIN - FUNCTIONAL ASSESSMENT: PAIN_FUNCTIONAL_ASSESSMENT: ACTIVITIES ARE NOT PREVENTED

## 2024-01-17 ASSESSMENT — PAIN DESCRIPTION - LOCATION: LOCATION: VAGINA

## 2024-01-17 ASSESSMENT — PAIN SCALES - GENERAL: PAINLEVEL_OUTOF10: 4

## 2024-01-17 ASSESSMENT — PAIN DESCRIPTION - DESCRIPTORS: DESCRIPTORS: SORE

## 2024-01-17 ASSESSMENT — PAIN DESCRIPTION - ORIENTATION: ORIENTATION: LOWER

## 2024-01-17 NOTE — LACTATION NOTE
This note was copied from a baby's chart.  Mom felt she was having issues feeding her baby.  Had her place baby in prone position and he latched with good sucking bursts.  Instructed her that if baby does not have a dep enough latch, he will not stay latched to the breast.  Showed her how to compress breast to assist with deep latch.  Baby fed for 20 minutes while LC was in the room.  Anticipatory guidance given.  Questions answered.  Discussed signs of baby's allergy, excema.  Discussed engorgement management, when breast are soft and flat you are making more milk than when hard and engorged.  If you should have to take a medication and MD says can't breast feed contact lactation office.  Breast feed if you or the baby gets sick to pass along natural immunologic protection.

## 2024-01-17 NOTE — LACTATION NOTE
This note was copied from a baby's chart.  Pt will successfully establish breastfeeding by feeding in response to early feeding cues   or wake every 3h, will obtain deep latch, and will keep log of feedings/output.  Taught to BF at hunger cues and or q 2-3 hrs and to offer 10-20 drops of hand expressed colostrum at any non-feeds.                  LATCH Documentation  Latch: Grasps breast, tongue down, lips flanged, rhythmic sucking  Audible Swallowing: Spontaneous and intermittent (24 hours old)  Type of Nipple: Everted (after stimulation)  Comfort (Breast/Nipple): Soft/non-tender  Hold (Positioning): No assist from staff, mother able to position/hold infant  LATCH Score: 10Chart shows numerous feedings, void, stool WNL.  Discussed importance of monitoring outputs and feedings on first week of life.  Discussed ways to tell if baby is  getting enough breast milk, ie  voids and stools, change in color of stool, and return to birth wt within 2 weeks.  Follow up with pediatrician visit for weight check in 1-2 days (per AAP guidelines.)  Encouraged to call Warm Line  [phone]  for any questions/problems that arise. Mother also given breastfeeding support group dates and times for any future needs

## 2024-01-17 NOTE — PROGRESS NOTES
1133 Verbal and written discharge instructions provided to pt. Pt verbalized understanding. PP supplies provided.

## 2024-01-17 NOTE — LACTATION NOTE
This note was copied from a baby's chart.  Mom states\" I have been pumping and trying to breastfeed.  I am only getting drops.  We are both learning.\"  Instructed Mom to call LC when she get ready to feed

## 2024-01-17 NOTE — PROGRESS NOTES
Post-Partum Day Number 2 Progress Note    Patient doing well post-partum without significant complaints.  Voiding without difficulty, normal lochia.    Vitals:  Patient Vitals for the past 24 hrs:   BP Temp Temp src Pulse Resp SpO2   24 0727 117/76 98.4 °F (36.9 °C) Oral 83 16 99 %   24 2244 107/80 97.9 °F (36.6 °C) Oral 88 12 99 %   24 1549 104/68 97.5 °F (36.4 °C) Oral 86 18 99 %     Temp (24hrs), Av.9 °F (36.6 °C), Min:97.5 °F (36.4 °C), Max:98.4 °F (36.9 °C)      Vital signs stable, afebrile.    Exam:  Patient without distress.               Abdomen soft, fundus firm, nontender               Lower extremities, WILLIAMS nontender w/o edema    Labs: No results found for this or any previous visit (from the past 24 hour(s)).    Assessment and Plan:  PPD 2 s/p    VMI / Rh pos / Rubella immune / circ done  Routine postpartum/postop care.  Discharge today-instructions reviewed, follow up in office in 4 weeks.    Gillian Dennis DO, FACOG  [facility]

## 2024-01-20 ENCOUNTER — LACTATION ENCOUNTER (OUTPATIENT)
Facility: HOSPITAL | Age: 29
End: 2024-01-20
